# Patient Record
Sex: FEMALE | Race: OTHER | HISPANIC OR LATINO | Employment: STUDENT | URBAN - METROPOLITAN AREA
[De-identification: names, ages, dates, MRNs, and addresses within clinical notes are randomized per-mention and may not be internally consistent; named-entity substitution may affect disease eponyms.]

---
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
## 2021-03-11 NOTE — PATIENT DISCUSSION
NO Glasses Rx given. =  PT HAPPY WITH HER DISTANCE VA AND UNABLE TO MAKE OS BETTER WITH TESTING TODAY.

## 2021-10-22 ENCOUNTER — ESTABLISHED COMPREHENSIVE EXAM (OUTPATIENT)
Dept: URBAN - METROPOLITAN AREA CLINIC 46 | Facility: CLINIC | Age: 21
End: 2021-10-22

## 2021-10-22 DIAGNOSIS — Z46.0: ICD-10-CM

## 2021-10-22 DIAGNOSIS — H52.7: ICD-10-CM

## 2021-10-22 PROCEDURE — 92310-1 LEVEL 1 CONTACT LENS MANAGEMENT

## 2021-10-22 PROCEDURE — 92015 DETERMINE REFRACTIVE STATE: CPT

## 2021-10-22 PROCEDURE — 92014 COMPRE OPH EXAM EST PT 1/>: CPT

## 2021-10-22 ASSESSMENT — VISUAL ACUITY
OS_SC: J1+
OD_SC: CF 3FT
OS_SC: CF 2FT
OS_CC: J1
OD_SC: J1+
OD_CC: J1

## 2021-10-22 ASSESSMENT — TONOMETRY
OS_IOP_MMHG: 15
OD_IOP_MMHG: 15

## 2022-02-01 NOTE — PATIENT DISCUSSION
STOP LATANOPROST S/S OF SIDE EFFECTS DISCUSSED WITH PT, WITH PT UNDERSTANDING. GOAL : 15-16. PT DID NOT MEET THE GOAL WITH THIS DROP.

## 2022-03-03 NOTE — PATIENT DISCUSSION
CONTINUE BIMATOPROST 1 DROP QHS OU UNTIL BOTTLE IS GONE.  THEN RESTART THE LATANOPROST 1 DROP QHS OU AND ADD TIMOLOL . 5% 1 DROP QAM OU.

## 2022-12-29 ENCOUNTER — COMPREHENSIVE EXAM (OUTPATIENT)
Dept: URBAN - METROPOLITAN AREA CLINIC 46 | Facility: CLINIC | Age: 22
End: 2022-12-29

## 2022-12-29 PROCEDURE — 92014 COMPRE OPH EXAM EST PT 1/>: CPT

## 2022-12-29 PROCEDURE — 92310-1 LEVEL 1 CONTACT LENS MANAGEMENT

## 2022-12-29 PROCEDURE — 92015 DETERMINE REFRACTIVE STATE: CPT

## 2022-12-29 ASSESSMENT — TONOMETRY
OD_IOP_MMHG: 14
OS_IOP_MMHG: 14

## 2022-12-29 ASSESSMENT — VISUAL ACUITY
OS_SC: 20/800
OD_CC: J1+
OS_CC: 20/20
OS_CC: J1+
OD_CC: 20/20
OD_SC: 20/800

## 2024-01-05 ENCOUNTER — COMPREHENSIVE EXAM (OUTPATIENT)
Dept: URBAN - METROPOLITAN AREA CLINIC 43 | Facility: CLINIC | Age: 24
End: 2024-01-05

## 2024-01-05 DIAGNOSIS — Z97.3: ICD-10-CM

## 2024-01-05 DIAGNOSIS — H52.7: ICD-10-CM

## 2024-01-05 PROCEDURE — 92310-1 LEVEL 1 CONTACT LENS MANAGEMENT

## 2024-01-05 PROCEDURE — 92014 COMPRE OPH EXAM EST PT 1/>: CPT

## 2024-01-05 PROCEDURE — 92015 DETERMINE REFRACTIVE STATE: CPT

## 2024-01-05 ASSESSMENT — VISUAL ACUITY
OS_CC: J1
OS_SC: CF 6FT
OD_SC: J1
OD_CC: 20/25
OD_CC: J1
OS_SC: J1
OD_SC: CF 6FT
OS_CC: 20/25

## 2024-01-05 ASSESSMENT — TONOMETRY
OD_IOP_MMHG: 13
OS_IOP_MMHG: 15

## 2024-05-03 ENCOUNTER — FOLLOW UP (OUTPATIENT)
Dept: URBAN - METROPOLITAN AREA CLINIC 46 | Facility: CLINIC | Age: 24
End: 2024-05-03

## 2024-05-03 DIAGNOSIS — H52.7: ICD-10-CM

## 2024-05-03 PROCEDURE — 92015 DETERMINE REFRACTIVE STATE: CPT

## 2024-07-02 NOTE — PATIENT DISCUSSION
No protocol for requested medication.    Medication: loratadine-pseudoephedrine (Claritin-D 24 Hour)  MG per 24 hr tablet   Last office visit date: 1-3-24  Pharmacy: [facility] - [place], [place] - [address]    Order pended, routed to clinician for review.      Patient educated on condition.

## 2025-05-12 ENCOUNTER — COMPREHENSIVE EXAM (OUTPATIENT)
Age: 25
End: 2025-05-12

## 2025-05-12 DIAGNOSIS — H35.413: ICD-10-CM

## 2025-05-12 DIAGNOSIS — Z46.0: ICD-10-CM

## 2025-05-12 DIAGNOSIS — Z97.3: ICD-10-CM

## 2025-05-12 DIAGNOSIS — H52.7: ICD-10-CM

## 2025-05-12 PROCEDURE — 92014 COMPRE OPH EXAM EST PT 1/>: CPT

## 2025-05-12 PROCEDURE — 92015 DETERMINE REFRACTIVE STATE: CPT

## 2025-05-12 PROCEDURE — 92310-1 LEVEL 1 SOFT LENS UPDATE
